# Patient Record
Sex: MALE | Race: WHITE | NOT HISPANIC OR LATINO | Employment: UNEMPLOYED | URBAN - METROPOLITAN AREA
[De-identification: names, ages, dates, MRNs, and addresses within clinical notes are randomized per-mention and may not be internally consistent; named-entity substitution may affect disease eponyms.]

---

## 2017-04-07 ENCOUNTER — APPOINTMENT (OUTPATIENT)
Dept: GENERAL RADIOLOGY | Facility: HOSPITAL | Age: 22
End: 2017-04-07

## 2017-04-07 ENCOUNTER — HOSPITAL ENCOUNTER (EMERGENCY)
Facility: HOSPITAL | Age: 22
Discharge: HOME | End: 2017-04-07
Attending: EMERGENCY MEDICINE | Admitting: EMERGENCY MEDICINE

## 2017-04-07 VITALS
HEIGHT: 71 IN | TEMPERATURE: 98.1 F | DIASTOLIC BLOOD PRESSURE: 80 MMHG | OXYGEN SATURATION: 94 % | HEART RATE: 60 BPM | RESPIRATION RATE: 16 BRPM | SYSTOLIC BLOOD PRESSURE: 133 MMHG | BODY MASS INDEX: 30.1 KG/M2 | WEIGHT: 215 LBS

## 2017-04-07 DIAGNOSIS — S86.911A KNEE STRAIN, RIGHT, INITIAL ENCOUNTER: Primary | ICD-10-CM

## 2017-04-07 PROCEDURE — 73560 X-RAY EXAM OF KNEE 1 OR 2: CPT

## 2017-04-07 PROCEDURE — 99284 EMERGENCY DEPT VISIT MOD MDM: CPT

## 2017-04-07 RX ORDER — NAPROXEN 500 MG/1
500 TABLET ORAL ONCE
Status: COMPLETED | OUTPATIENT
Start: 2017-04-07 | End: 2017-04-07

## 2017-04-07 RX ORDER — NAPROXEN 500 MG/1
500 TABLET ORAL 2 TIMES DAILY WITH MEALS
Qty: 10 TABLET | Refills: 0 | Status: SHIPPED | OUTPATIENT
Start: 2017-04-07 | End: 2017-05-25

## 2017-04-07 RX ORDER — HYDROCODONE BITARTRATE AND ACETAMINOPHEN 5; 325 MG/1; MG/1
1 TABLET ORAL EVERY 6 HOURS PRN
Qty: 10 TABLET | Refills: 0 | Status: SHIPPED | OUTPATIENT
Start: 2017-04-07 | End: 2020-01-30

## 2017-04-07 RX ADMIN — NAPROXEN 500 MG: 500 TABLET ORAL at 13:17

## 2017-04-07 NOTE — ED PROVIDER NOTES
Subjective   HPI Comments: Vision playing basketball last night states that he turned his ankle and his knee twisted describing a valgus strain.  Since that time, he has had knee pain and difficulty in straightening his knee as well as flexing his knee.  He's also been unable to bear weight.  He denies any other areas of pain including ankle, tib-fib, hip, or other portions of his extremity.  He denies any other injury or complaints.    Patient is a 21 y.o. male presenting with lower extremity pain.   History provided by:  Patient  Lower Extremity Issue   Location:  Knee  Injury: yes    Mechanism of injury comment:  TWIST  Knee location:  R knee  Pain details:     Quality:  Dull    Severity:  Moderate    Onset quality:  Sudden    Timing:  Constant    Progression:  Worsening  Chronicity:  New  Dislocation: no    Foreign body present:  No foreign bodies  Prior injury to area:  No  Relieved by:  Nothing  Worsened by:  Bearing weight  Ineffective treatments:  None tried  Associated symptoms: swelling    Associated symptoms: no back pain, no fever, no numbness and no tingling        Review of Systems   Constitutional: Negative for fever.   Musculoskeletal: Negative for back pain.   All other systems reviewed and are negative.      History reviewed. No pertinent past medical history.    No Known Allergies    History reviewed. No pertinent surgical history.    History reviewed. No pertinent family history.    Social History     Social History   • Marital status: Single     Spouse name: N/A   • Number of children: N/A   • Years of education: N/A     Social History Main Topics   • Smoking status: Never Smoker   • Smokeless tobacco: Current User     Types: Chew   • Alcohol use 3.0 oz/week     5 Cans of beer per week   • Drug use: No   • Sexual activity: Defer     Other Topics Concern   • None     Social History Narrative   • None           Objective   Physical Exam   Constitutional: He appears well-developed and  "well-nourished.   HENT:   Head: Normocephalic and atraumatic.   Pulmonary/Chest: Effort normal.   Musculoskeletal: Normal range of motion.   SMALL EFFUSION ON EXAM BUT PATELLA IS IN LINE AND VISIBLE. KNEE IS STABLE. THE AREA IS TENDER. PT DENIES ANY TENDERNESS ON PALPATION. PULSES NORMAL   Neurological: He is alert.   Skin: Skin is warm and dry.   Psychiatric: He has a normal mood and affect. His behavior is normal. Judgment and thought content normal.   Nursing note and vitals reviewed.      Procedures         ED Course  ED Course   Comment By Time   PT WITH NO OTHER COMPLAINTS OR CONCERNS ANGELA Munguia 04/07 1357   KNEE NAD PER JS VC ANGELA Munguia 04/07 1409   PT TOLD TO NOT USE IMMOBILIZER WHILE SLEEPING. ANGELA Munguia 04/07 1416                No results found for this or any previous visit (from the past 24 hour(s)).  Note: In addition to lab results from this visit, the labs listed above may include labs taken at another facility or during a different encounter within the last 24 hours. Please correlate lab times with ED admission and discharge times for further clarification of the services performed during this visit.    XR Knee 1 or 2 View Right    (Results Pending)     Vitals:    04/07/17 1137 04/07/17 1313   BP: 147/93 133/88   BP Location: Right arm    Patient Position: Sitting    Pulse: 64    Resp: 18    Temp: 98.1 °F (36.7 °C)    TempSrc: Oral    SpO2: 100% 100%   Weight: 215 lb (97.5 kg)    Height: 71\" (180.3 cm)      Medications   naproxen (NAPROSYN) tablet 500 mg (500 mg Oral Given 4/7/17 1317)     ECG/EMG Results (last 24 hours)     ** No results found for the last 24 hours. **          [facility]    Final diagnoses:   Knee strain, right, initial encounter            ANGELA Munguia  04/07/17 1420    "

## 2017-04-07 NOTE — DISCHARGE INSTRUCTIONS
USE IMMOBILIZER AND CRUTCHES UNTIL YOU SEE YOUR SPECIALIST.  Information regarding Risks and Benefits When using Opioids and Other Controlled Substances to include Storage and Disposal of Medications    When considering the use of opioids and other controlled substances for the control of pain, anxiety, or for other medical purposes, you need to know of not only the benefits of these drugs but also of potential risks in using these drugs. These drugs, as well as more drugs, have beneficial uses; which is why their use is being considered in your   care, but they have risks involved in their use, too.    Opioids:  Opioids such as hydrocodone, oxycodone, hydromorphone, and codeine are pain relieving drugs, some more potent than others. They are most useful for moderate to more severe painful conditions. Risks include sedation, loss of coordination, decreased concentration, and decreased breathing with possibility of loss of consciousness or even death, especially if used in doses higher than prescribed. Improper usage can lead to addiction, tolerance, or overdose. In addition, many of these drugs are combined with acetaminophen (Tylenol) which can damage or destroy our liver when used excessively.  Alternatives to opioids are useful for mild to moderate pain and include ibuprofen (Motrin), naproxen (Aleve), aspirin, and acetaminophen (Tylenol). As with other drugs, these medications should be used according to directions on the label or from your doctor, as overuse can cause harm.    Benzodiazepines:  This group of drugs include: alprazolam (Xanax), diazepam (Valium), lorazepam (Ativan), and clonazepam (Klonopin). These drugs are used to control anxiety symptoms including anxiety and panic attacks. Risks using these drugs include: sedation, loss of coordination, decreased ability to concentrate, effects on memory, and decreased breathing with possibility of loss of consciousness or even death. Improper and prolonged  usage can lead to addiction. An alternative without addiction potential is hydroxyzine (Vistrail).    Other Controlled Substance:  This group includes Soma, Tramadol, stimulant drugs such as Ritalin, and others. Stimulant drugs are not medications that are prescribed by ER doctors. Soma and Tramadol have sedative and addictive affects similar to opioids with the same dangers mentioned with them.    Overdose:  If you or someone else are concerned that overdose has occurred, call 911 for transportation to the nearest hospital.    Storage and Disposal:  All medications need to be kept out of the reach of children or adults who cannot manage their own medicines. In addition, controlled substances can be targeted by criminals so extra precautions need to be taken to keep them in a safe, secure place. Any unused medications should be disposed of by flushing them down the toilet in the home setting or contact your local pharmacy.

## 2017-05-25 ENCOUNTER — HOSPITAL ENCOUNTER (EMERGENCY)
Facility: HOSPITAL | Age: 22
Discharge: HOME | End: 2017-05-25
Attending: EMERGENCY MEDICINE | Admitting: EMERGENCY MEDICINE

## 2017-05-25 ENCOUNTER — APPOINTMENT (OUTPATIENT)
Dept: GENERAL RADIOLOGY | Facility: HOSPITAL | Age: 22
End: 2017-05-25

## 2017-05-25 VITALS
OXYGEN SATURATION: 99 % | SYSTOLIC BLOOD PRESSURE: 148 MMHG | HEIGHT: 71 IN | HEART RATE: 80 BPM | DIASTOLIC BLOOD PRESSURE: 70 MMHG | WEIGHT: 210 LBS | TEMPERATURE: 98.6 F | RESPIRATION RATE: 16 BRPM | BODY MASS INDEX: 29.4 KG/M2

## 2017-05-25 DIAGNOSIS — M23.91 INTERNAL DERANGEMENT OF KNEE, RIGHT: Primary | ICD-10-CM

## 2017-05-25 PROCEDURE — 73560 X-RAY EXAM OF KNEE 1 OR 2: CPT

## 2017-05-25 PROCEDURE — 99283 EMERGENCY DEPT VISIT LOW MDM: CPT

## 2017-05-25 RX ORDER — TRAMADOL HYDROCHLORIDE 50 MG/1
50 TABLET ORAL EVERY 6 HOURS PRN
Qty: 8 TABLET | Refills: 0 | Status: SHIPPED | OUTPATIENT
Start: 2017-05-25 | End: 2020-01-30

## 2017-05-25 RX ORDER — NAPROXEN 500 MG/1
500 TABLET ORAL 2 TIMES DAILY WITH MEALS
Qty: 30 TABLET | Refills: 0 | Status: SHIPPED | OUTPATIENT
Start: 2017-05-25 | End: 2020-01-30

## 2020-01-01 ENCOUNTER — APPOINTMENT (OUTPATIENT)
Dept: GENERAL RADIOLOGY | Facility: HOSPITAL | Age: 25
End: 2020-01-01

## 2020-01-01 ENCOUNTER — HOSPITAL ENCOUNTER (EMERGENCY)
Facility: HOSPITAL | Age: 25
Discharge: HOME | End: 2020-01-01
Attending: EMERGENCY MEDICINE | Admitting: EMERGENCY MEDICINE

## 2020-01-01 VITALS
HEART RATE: 113 BPM | SYSTOLIC BLOOD PRESSURE: 136 MMHG | WEIGHT: 210 LBS | RESPIRATION RATE: 20 BRPM | DIASTOLIC BLOOD PRESSURE: 74 MMHG | HEIGHT: 70 IN | OXYGEN SATURATION: 96 % | BODY MASS INDEX: 30.06 KG/M2 | TEMPERATURE: 98.6 F

## 2020-01-01 DIAGNOSIS — M23.91 INTERNAL DERANGEMENT OF RIGHT KNEE: Primary | ICD-10-CM

## 2020-01-01 PROCEDURE — 99283 EMERGENCY DEPT VISIT LOW MDM: CPT

## 2020-01-01 PROCEDURE — 73560 X-RAY EXAM OF KNEE 1 OR 2: CPT

## 2020-01-01 NOTE — ED PROVIDER NOTES
"Subjective   Pino Rodriguez is a 24 y.o.male who presents to the ED with complaints of right knee pain. The patient reports he has been experiencing pain in his right knee for the pas two days. He denies any injury or trauma to cause his pain, for he states he woke up experiencing pain yesterday morning. He states his pain causes him to have difficulty extending his knee. He adds that his knee has been, \"popping in and out of place,\" for the past couple days but his pain did not onset until yesterday. He has not taken any medication for his pain. Additionally, he states he tore the meniscus and ACL in his right knee a few years ago when he sustained a sports related injury. He did not see a surgeon following the injury. There are no other complaints at this time.       History provided by:  Patient  Knee Pain   Location:  Knee  Time since incident:  2 days  Injury: no    Knee location:  R knee  Pain details:     Quality:  Aching    Radiates to:  Does not radiate    Severity:  Moderate    Onset quality:  Sudden    Duration:  2 days    Timing:  Constant    Progression:  Unchanged  Chronicity:  New  Dislocation: no    Foreign body present:  No foreign bodies  Prior injury to area:  Yes  Relieved by:  None tried  Worsened by:  Extension  Ineffective treatments:  None tried      Review of Systems   Musculoskeletal: Positive for arthralgias (right knee).   All other systems reviewed and are negative.      History reviewed. No pertinent past medical history.    No Known Allergies    History reviewed. No pertinent surgical history.    History reviewed. No pertinent family history.    Social History     Socioeconomic History   • Marital status: Single     Spouse name: Not on file   • Number of children: Not on file   • Years of education: Not on file   • Highest education level: Not on file   Tobacco Use   • Smoking status: Never Smoker   • Smokeless tobacco: Current User     Types: Chew   Substance and Sexual Activity   • " Alcohol use: Yes     Alcohol/week: 5.0 standard drinks     Types: 5 Cans of beer per week   • Drug use: No   • Sexual activity: Defer         Objective   Physical Exam   Constitutional: He is oriented to person, place, and time. He appears well-developed and well-nourished.   HENT:   Head: Normocephalic and atraumatic.   Eyes: Conjunctivae and EOM are normal. No scleral icterus.   Neck: Normal range of motion. Neck supple.   Pulmonary/Chest: Effort normal. No respiratory distress.   Musculoskeletal: He exhibits no edema or tenderness.   Pain with extension of right knee. No focal tenderness, swelling, or deformity.  No laxity on drawer tests.  Patella not loose.  Tendons feel intact.     Neurological: He is alert and oriented to person, place, and time.   Skin: Skin is warm and dry.   Psychiatric: He has a normal mood and affect. His behavior is normal.   Nursing note and vitals reviewed.      Procedures         ED Course     XR negative, c/w prior ACL injury not treated.  No evidence of patellar or knee dislocation on x-ray.  No evidence of vascular compromise on exam.  Pt needs ortho followup.  Knee immobilizer ordered.  He has crutches.  Pt counseled.                MDM  Number of Diagnoses or Management Options  Internal derangement of right knee:      Amount and/or Complexity of Data Reviewed  Tests in the radiology section of CPT®: reviewed and ordered  Independent visualization of images, tracings, or specimens: yes        Final diagnoses:   Internal derangement of right knee       Documentation assistance provided by zara Baker.  Information recorded by the zara was done at my direction and has been verified and validated by me.     Fred Baker  01/01/20 1618       Fred Baker  01/01/20 1628       Fred Baker  01/01/20 1819       Lance Adam MD  01/01/20 2036

## 2020-01-30 ENCOUNTER — HOSPITAL ENCOUNTER (OUTPATIENT)
Dept: GENERAL RADIOLOGY | Facility: HOSPITAL | Age: 25
Discharge: HOME | End: 2020-01-30
Admitting: ORTHOPAEDIC SURGERY

## 2020-01-30 ENCOUNTER — APPOINTMENT (OUTPATIENT)
Dept: PREADMISSION TESTING | Facility: HOSPITAL | Age: 25
End: 2020-01-30

## 2020-01-30 VITALS — BODY MASS INDEX: 32.41 KG/M2 | WEIGHT: 226.41 LBS | HEIGHT: 70 IN

## 2020-01-30 LAB
ALBUMIN SERPL-MCNC: 4.8 G/DL (ref 3.5–5.2)
ALBUMIN/GLOB SERPL: 1.8 G/DL
ALP SERPL-CCNC: 79 U/L (ref 39–117)
ALT SERPL W P-5'-P-CCNC: 30 U/L (ref 1–41)
ANION GAP SERPL CALCULATED.3IONS-SCNC: 11 MMOL/L (ref 5–15)
APTT PPP: 32.8 SECONDS (ref 24–37)
AST SERPL-CCNC: 15 U/L (ref 1–40)
BACTERIA UR QL AUTO: NORMAL /HPF
BASOPHILS # BLD AUTO: 0.08 10*3/MM3 (ref 0–0.2)
BASOPHILS NFR BLD AUTO: 0.7 % (ref 0–1.5)
BILIRUB SERPL-MCNC: 0.4 MG/DL (ref 0.2–1.2)
BILIRUB UR QL STRIP: NEGATIVE
BUN BLD-MCNC: 15 MG/DL (ref 6–20)
BUN/CREAT SERPL: 15.3 (ref 7–25)
CALCIUM SPEC-SCNC: 10.3 MG/DL (ref 8.6–10.5)
CHLORIDE SERPL-SCNC: 103 MMOL/L (ref 98–107)
CLARITY UR: CLEAR
CO2 SERPL-SCNC: 26 MMOL/L (ref 22–29)
COLOR UR: YELLOW
CREAT BLD-MCNC: 0.98 MG/DL (ref 0.76–1.27)
DEPRECATED RDW RBC AUTO: 43.7 FL (ref 37–54)
EOSINOPHIL # BLD AUTO: 0.33 10*3/MM3 (ref 0–0.4)
EOSINOPHIL NFR BLD AUTO: 3.1 % (ref 0.3–6.2)
ERYTHROCYTE [DISTWIDTH] IN BLOOD BY AUTOMATED COUNT: 13.4 % (ref 12.3–15.4)
GFR SERPL CREATININE-BSD FRML MDRD: 94 ML/MIN/1.73
GLOBULIN UR ELPH-MCNC: 2.6 GM/DL
GLUCOSE BLD-MCNC: 86 MG/DL (ref 65–99)
GLUCOSE UR STRIP-MCNC: NEGATIVE MG/DL
HCT VFR BLD AUTO: 48 % (ref 37.5–51)
HGB BLD-MCNC: 15.6 G/DL (ref 13–17.7)
HGB UR QL STRIP.AUTO: NEGATIVE
HYALINE CASTS UR QL AUTO: NORMAL /LPF
IMM GRANULOCYTES # BLD AUTO: 0.04 10*3/MM3 (ref 0–0.05)
IMM GRANULOCYTES NFR BLD AUTO: 0.4 % (ref 0–0.5)
INR PPP: 0.96 (ref 0.85–1.16)
KETONES UR QL STRIP: NEGATIVE
LEUKOCYTE ESTERASE UR QL STRIP.AUTO: NEGATIVE
LYMPHOCYTES # BLD AUTO: 3.54 10*3/MM3 (ref 0.7–3.1)
LYMPHOCYTES NFR BLD AUTO: 33 % (ref 19.6–45.3)
MCH RBC QN AUTO: 28.8 PG (ref 26.6–33)
MCHC RBC AUTO-ENTMCNC: 32.5 G/DL (ref 31.5–35.7)
MCV RBC AUTO: 88.6 FL (ref 79–97)
MONOCYTES # BLD AUTO: 1.01 10*3/MM3 (ref 0.1–0.9)
MONOCYTES NFR BLD AUTO: 9.4 % (ref 5–12)
NEUTROPHILS # BLD AUTO: 5.72 10*3/MM3 (ref 1.7–7)
NEUTROPHILS NFR BLD AUTO: 53.4 % (ref 42.7–76)
NITRITE UR QL STRIP: NEGATIVE
NRBC BLD AUTO-RTO: 0 /100 WBC (ref 0–0.2)
PH UR STRIP.AUTO: <=5 [PH] (ref 5–8)
PLATELET # BLD AUTO: 486 10*3/MM3 (ref 140–450)
PMV BLD AUTO: 9.2 FL (ref 6–12)
POTASSIUM BLD-SCNC: 4.7 MMOL/L (ref 3.5–5.2)
PROT SERPL-MCNC: 7.4 G/DL (ref 6–8.5)
PROT UR QL STRIP: NEGATIVE
PROTHROMBIN TIME: 12.3 SECONDS (ref 11.2–14.3)
RBC # BLD AUTO: 5.42 10*6/MM3 (ref 4.14–5.8)
RBC # UR: NORMAL /HPF
REF LAB TEST METHOD: NORMAL
SODIUM BLD-SCNC: 140 MMOL/L (ref 136–145)
SP GR UR STRIP: 1.02 (ref 1–1.03)
SQUAMOUS #/AREA URNS HPF: NORMAL /HPF
UROBILINOGEN UR QL STRIP: NORMAL
WBC NRBC COR # BLD: 10.72 10*3/MM3 (ref 3.4–10.8)
WBC UR QL AUTO: NORMAL /HPF

## 2020-01-30 PROCEDURE — 81001 URINALYSIS AUTO W/SCOPE: CPT | Performed by: ORTHOPAEDIC SURGERY

## 2020-01-30 PROCEDURE — 71046 X-RAY EXAM CHEST 2 VIEWS: CPT

## 2020-01-30 PROCEDURE — 36415 COLL VENOUS BLD VENIPUNCTURE: CPT

## 2020-01-30 PROCEDURE — 85730 THROMBOPLASTIN TIME PARTIAL: CPT | Performed by: ORTHOPAEDIC SURGERY

## 2020-01-30 PROCEDURE — 80053 COMPREHEN METABOLIC PANEL: CPT | Performed by: ORTHOPAEDIC SURGERY

## 2020-01-30 PROCEDURE — 93005 ELECTROCARDIOGRAM TRACING: CPT

## 2020-01-30 PROCEDURE — 85610 PROTHROMBIN TIME: CPT | Performed by: ORTHOPAEDIC SURGERY

## 2020-01-30 PROCEDURE — 93010 ELECTROCARDIOGRAM REPORT: CPT | Performed by: INTERNAL MEDICINE

## 2020-01-30 PROCEDURE — 85025 COMPLETE CBC W/AUTO DIFF WBC: CPT | Performed by: ORTHOPAEDIC SURGERY

## 2020-02-03 ENCOUNTER — ANESTHESIA (OUTPATIENT)
Dept: PERIOP | Facility: HOSPITAL | Age: 25
End: 2020-02-03

## 2020-02-03 ENCOUNTER — HOSPITAL ENCOUNTER (OUTPATIENT)
Facility: HOSPITAL | Age: 25
Discharge: HOME | End: 2020-02-03
Attending: ORTHOPAEDIC SURGERY | Admitting: ORTHOPAEDIC SURGERY

## 2020-02-03 ENCOUNTER — APPOINTMENT (OUTPATIENT)
Dept: GENERAL RADIOLOGY | Facility: HOSPITAL | Age: 25
End: 2020-02-03

## 2020-02-03 ENCOUNTER — ANESTHESIA EVENT (OUTPATIENT)
Dept: PERIOP | Facility: HOSPITAL | Age: 25
End: 2020-02-03

## 2020-02-03 VITALS
DIASTOLIC BLOOD PRESSURE: 94 MMHG | WEIGHT: 226 LBS | RESPIRATION RATE: 18 BRPM | BODY MASS INDEX: 32.35 KG/M2 | OXYGEN SATURATION: 96 % | HEART RATE: 80 BPM | TEMPERATURE: 98.3 F | SYSTOLIC BLOOD PRESSURE: 141 MMHG | HEIGHT: 70 IN

## 2020-02-03 PROCEDURE — 25010000002 DEXAMETHASONE PER 1 MG: Performed by: NURSE ANESTHETIST, CERTIFIED REGISTERED

## 2020-02-03 PROCEDURE — 25010000002 BUPRENORPHINE PER 0.1 MG: Performed by: ANESTHESIOLOGY

## 2020-02-03 PROCEDURE — 25010000002 ROPIVACAINE PER 1 MG: Performed by: NURSE ANESTHETIST, CERTIFIED REGISTERED

## 2020-02-03 PROCEDURE — 25010000002 MIDAZOLAM PER 1 MG: Performed by: ANESTHESIOLOGY

## 2020-02-03 PROCEDURE — C1713 ANCHOR/SCREW BN/BN,TIS/BN: HCPCS | Performed by: ORTHOPAEDIC SURGERY

## 2020-02-03 PROCEDURE — 25010000002 DEXAMETHASONE SODIUM PHOSPHATE 10 MG/ML SOLUTION: Performed by: ANESTHESIOLOGY

## 2020-02-03 PROCEDURE — 25010000003 CEFAZOLIN IN DEXTROSE 2-4 GM/100ML-% SOLUTION: Performed by: ORTHOPAEDIC SURGERY

## 2020-02-03 PROCEDURE — 25010000002 ONDANSETRON PER 1 MG: Performed by: NURSE ANESTHETIST, CERTIFIED REGISTERED

## 2020-02-03 PROCEDURE — 25010000002 FENTANYL CITRATE (PF) 100 MCG/2ML SOLUTION: Performed by: NURSE ANESTHETIST, CERTIFIED REGISTERED

## 2020-02-03 PROCEDURE — 97162 PT EVAL MOD COMPLEX 30 MIN: CPT

## 2020-02-03 PROCEDURE — 25010000002 EPINEPHRINE PER 0.1 MG: Performed by: ORTHOPAEDIC SURGERY

## 2020-02-03 PROCEDURE — 76000 FLUOROSCOPY <1 HR PHYS/QHP: CPT

## 2020-02-03 PROCEDURE — 97116 GAIT TRAINING THERAPY: CPT

## 2020-02-03 PROCEDURE — 25010000002 HYDROMORPHONE PER 4 MG: Performed by: NURSE ANESTHETIST, CERTIFIED REGISTERED

## 2020-02-03 PROCEDURE — 25010000002 PROPOFOL 10 MG/ML EMULSION: Performed by: NURSE ANESTHETIST, CERTIFIED REGISTERED

## 2020-02-03 DEVICE — IMPLANTABLE DEVICE: Type: IMPLANTABLE DEVICE | Site: KNEE | Status: FUNCTIONAL

## 2020-02-03 DEVICE — SUT FW #2 W/TPR NDL 1/2 CIR 38IN 97CM 26.5MM BLU: Type: IMPLANTABLE DEVICE | Site: KNEE | Status: FUNCTIONAL

## 2020-02-03 DEVICE — COMP CRV FIBERSTITCH W/2 POLYSTR COMP/FW: Type: IMPLANTABLE DEVICE | Site: KNEE | Status: FUNCTIONAL

## 2020-02-03 RX ORDER — LIDOCAINE HYDROCHLORIDE 10 MG/ML
INJECTION, SOLUTION EPIDURAL; INFILTRATION; INTRACAUDAL; PERINEURAL AS NEEDED
Status: DISCONTINUED | OUTPATIENT
Start: 2020-02-03 | End: 2020-02-03 | Stop reason: SURG

## 2020-02-03 RX ORDER — DEXAMETHASONE SODIUM PHOSPHATE 4 MG/ML
INJECTION, SOLUTION INTRA-ARTICULAR; INTRALESIONAL; INTRAMUSCULAR; INTRAVENOUS; SOFT TISSUE AS NEEDED
Status: DISCONTINUED | OUTPATIENT
Start: 2020-02-03 | End: 2020-02-03 | Stop reason: SURG

## 2020-02-03 RX ORDER — LIDOCAINE HYDROCHLORIDE 10 MG/ML
0.5 INJECTION, SOLUTION EPIDURAL; INFILTRATION; INTRACAUDAL; PERINEURAL ONCE AS NEEDED
Status: COMPLETED | OUTPATIENT
Start: 2020-02-03 | End: 2020-02-03

## 2020-02-03 RX ORDER — BUPRENORPHINE HYDROCHLORIDE 0.32 MG/ML
INJECTION INTRAMUSCULAR; INTRAVENOUS
Status: COMPLETED | OUTPATIENT
Start: 2020-02-03 | End: 2020-02-03

## 2020-02-03 RX ORDER — IPRATROPIUM BROMIDE AND ALBUTEROL SULFATE 2.5; .5 MG/3ML; MG/3ML
3 SOLUTION RESPIRATORY (INHALATION) ONCE AS NEEDED
Status: DISCONTINUED | OUTPATIENT
Start: 2020-02-03 | End: 2020-02-03 | Stop reason: HOSPADM

## 2020-02-03 RX ORDER — MAGNESIUM HYDROXIDE 1200 MG/15ML
LIQUID ORAL AS NEEDED
Status: DISCONTINUED | OUTPATIENT
Start: 2020-02-03 | End: 2020-02-03 | Stop reason: HOSPADM

## 2020-02-03 RX ORDER — PREGABALIN 75 MG/1
75 CAPSULE ORAL ONCE
Status: COMPLETED | OUTPATIENT
Start: 2020-02-03 | End: 2020-02-03

## 2020-02-03 RX ORDER — PROMETHAZINE HYDROCHLORIDE 25 MG/ML
6.25 INJECTION, SOLUTION INTRAMUSCULAR; INTRAVENOUS ONCE AS NEEDED
Status: DISCONTINUED | OUTPATIENT
Start: 2020-02-03 | End: 2020-02-03 | Stop reason: HOSPADM

## 2020-02-03 RX ORDER — CEFAZOLIN SODIUM 2 G/100ML
2 INJECTION, SOLUTION INTRAVENOUS ONCE
Status: COMPLETED | OUTPATIENT
Start: 2020-02-03 | End: 2020-02-03

## 2020-02-03 RX ORDER — SODIUM CHLORIDE, SODIUM LACTATE, POTASSIUM CHLORIDE, CALCIUM CHLORIDE 600; 310; 30; 20 MG/100ML; MG/100ML; MG/100ML; MG/100ML
9 INJECTION, SOLUTION INTRAVENOUS CONTINUOUS PRN
Status: DISCONTINUED | OUTPATIENT
Start: 2020-02-03 | End: 2020-02-03 | Stop reason: HOSPADM

## 2020-02-03 RX ORDER — BUPIVACAINE HYDROCHLORIDE 2.5 MG/ML
INJECTION, SOLUTION EPIDURAL; INFILTRATION; INTRACAUDAL
Status: COMPLETED | OUTPATIENT
Start: 2020-02-03 | End: 2020-02-03

## 2020-02-03 RX ORDER — SODIUM CHLORIDE 0.9 % (FLUSH) 0.9 %
10 SYRINGE (ML) INJECTION AS NEEDED
Status: DISCONTINUED | OUTPATIENT
Start: 2020-02-03 | End: 2020-02-03 | Stop reason: HOSPADM

## 2020-02-03 RX ORDER — OXYCODONE HYDROCHLORIDE 5 MG/1
5-10 TABLET ORAL EVERY 4 HOURS PRN
Qty: 25 TABLET | Refills: 0 | OUTPATIENT
Start: 2020-02-03 | End: 2022-08-16

## 2020-02-03 RX ORDER — ONDANSETRON 2 MG/ML
INJECTION INTRAMUSCULAR; INTRAVENOUS AS NEEDED
Status: DISCONTINUED | OUTPATIENT
Start: 2020-02-03 | End: 2020-02-03 | Stop reason: SURG

## 2020-02-03 RX ORDER — BUPIVACAINE HYDROCHLORIDE 2.5 MG/ML
INJECTION, SOLUTION EPIDURAL; INFILTRATION; INTRACAUDAL
Status: DISCONTINUED | OUTPATIENT
Start: 2020-02-03 | End: 2020-02-03 | Stop reason: SURG

## 2020-02-03 RX ORDER — MEPERIDINE HYDROCHLORIDE 25 MG/ML
12.5 INJECTION INTRAMUSCULAR; INTRAVENOUS; SUBCUTANEOUS
Status: DISCONTINUED | OUTPATIENT
Start: 2020-02-03 | End: 2020-02-03 | Stop reason: HOSPADM

## 2020-02-03 RX ORDER — ONDANSETRON 4 MG/1
4 TABLET, FILM COATED ORAL EVERY 8 HOURS PRN
Qty: 10 TABLET | Refills: 0 | OUTPATIENT
Start: 2020-02-03 | End: 2022-08-16

## 2020-02-03 RX ORDER — FAMOTIDINE 20 MG/1
20 TABLET, FILM COATED ORAL
Status: COMPLETED | OUTPATIENT
Start: 2020-02-03 | End: 2020-02-03

## 2020-02-03 RX ORDER — FENTANYL CITRATE 50 UG/ML
50 INJECTION, SOLUTION INTRAMUSCULAR; INTRAVENOUS
Status: DISCONTINUED | OUTPATIENT
Start: 2020-02-03 | End: 2020-02-03 | Stop reason: HOSPADM

## 2020-02-03 RX ORDER — MIDAZOLAM HYDROCHLORIDE 1 MG/ML
INJECTION INTRAMUSCULAR; INTRAVENOUS
Status: COMPLETED | OUTPATIENT
Start: 2020-02-03 | End: 2020-02-03

## 2020-02-03 RX ORDER — SODIUM CHLORIDE 0.9 % (FLUSH) 0.9 %
10 SYRINGE (ML) INJECTION EVERY 12 HOURS SCHEDULED
Status: DISCONTINUED | OUTPATIENT
Start: 2020-02-03 | End: 2020-02-03 | Stop reason: HOSPADM

## 2020-02-03 RX ORDER — FENTANYL CITRATE 50 UG/ML
INJECTION, SOLUTION INTRAMUSCULAR; INTRAVENOUS AS NEEDED
Status: DISCONTINUED | OUTPATIENT
Start: 2020-02-03 | End: 2020-02-03 | Stop reason: SURG

## 2020-02-03 RX ORDER — PROMETHAZINE HYDROCHLORIDE 25 MG/1
25 SUPPOSITORY RECTAL ONCE AS NEEDED
Status: DISCONTINUED | OUTPATIENT
Start: 2020-02-03 | End: 2020-02-03 | Stop reason: HOSPADM

## 2020-02-03 RX ORDER — ACETAMINOPHEN 500 MG
1000 TABLET ORAL ONCE
Status: COMPLETED | OUTPATIENT
Start: 2020-02-03 | End: 2020-02-03

## 2020-02-03 RX ORDER — LABETALOL HYDROCHLORIDE 5 MG/ML
5 INJECTION, SOLUTION INTRAVENOUS
Status: DISCONTINUED | OUTPATIENT
Start: 2020-02-03 | End: 2020-02-03 | Stop reason: HOSPADM

## 2020-02-03 RX ORDER — PROMETHAZINE HYDROCHLORIDE 25 MG/1
25 TABLET ORAL ONCE AS NEEDED
Status: DISCONTINUED | OUTPATIENT
Start: 2020-02-03 | End: 2020-02-03 | Stop reason: HOSPADM

## 2020-02-03 RX ORDER — PROPOFOL 10 MG/ML
VIAL (ML) INTRAVENOUS AS NEEDED
Status: DISCONTINUED | OUTPATIENT
Start: 2020-02-03 | End: 2020-02-03 | Stop reason: SURG

## 2020-02-03 RX ORDER — HYDROMORPHONE HYDROCHLORIDE 1 MG/ML
0.5 INJECTION, SOLUTION INTRAMUSCULAR; INTRAVENOUS; SUBCUTANEOUS
Status: DISCONTINUED | OUTPATIENT
Start: 2020-02-03 | End: 2020-02-03 | Stop reason: HOSPADM

## 2020-02-03 RX ORDER — DEXAMETHASONE SODIUM PHOSPHATE 10 MG/ML
INJECTION, SOLUTION INTRAMUSCULAR; INTRAVENOUS
Status: COMPLETED | OUTPATIENT
Start: 2020-02-03 | End: 2020-02-03

## 2020-02-03 RX ADMIN — ONDANSETRON 4 MG: 2 INJECTION INTRAMUSCULAR; INTRAVENOUS at 14:32

## 2020-02-03 RX ADMIN — HYDROMORPHONE HYDROCHLORIDE 0.5 MG: 1 INJECTION, SOLUTION INTRAMUSCULAR; INTRAVENOUS; SUBCUTANEOUS at 16:33

## 2020-02-03 RX ADMIN — FAMOTIDINE 20 MG: 20 TABLET ORAL at 09:17

## 2020-02-03 RX ADMIN — PROPOFOL 200 MG: 10 INJECTION, EMULSION INTRAVENOUS at 11:49

## 2020-02-03 RX ADMIN — BUPIVACAINE HYDROCHLORIDE 30 ML: 2.5 INJECTION, SOLUTION EPIDURAL; INFILTRATION; INTRACAUDAL; PERINEURAL at 09:57

## 2020-02-03 RX ADMIN — DEXAMETHASONE SODIUM PHOSPHATE 8 MG: 4 INJECTION, SOLUTION INTRAMUSCULAR; INTRAVENOUS at 11:55

## 2020-02-03 RX ADMIN — LIDOCAINE HYDROCHLORIDE 50 MG: 10 INJECTION, SOLUTION EPIDURAL; INFILTRATION; INTRACAUDAL; PERINEURAL at 11:49

## 2020-02-03 RX ADMIN — FENTANYL CITRATE 50 MCG: 50 INJECTION, SOLUTION INTRAMUSCULAR; INTRAVENOUS at 14:14

## 2020-02-03 RX ADMIN — CEFAZOLIN SODIUM 2 G: 2 INJECTION, SOLUTION INTRAVENOUS at 11:43

## 2020-02-03 RX ADMIN — BUPRENORPHINE HYDROCHLORIDE 0.3 MG: 0.32 INJECTION INTRAMUSCULAR; INTRAVENOUS at 09:57

## 2020-02-03 RX ADMIN — FENTANYL CITRATE 50 MCG: 50 INJECTION, SOLUTION INTRAMUSCULAR; INTRAVENOUS at 11:49

## 2020-02-03 RX ADMIN — ONDANSETRON 4 MG: 2 INJECTION INTRAMUSCULAR; INTRAVENOUS at 15:03

## 2020-02-03 RX ADMIN — BUPIVACAINE HYDROCHLORIDE 20 ML: 2.5 INJECTION, SOLUTION EPIDURAL; INFILTRATION; INTRACAUDAL; PERINEURAL at 09:55

## 2020-02-03 RX ADMIN — ACETAMINOPHEN 1000 MG: 500 TABLET ORAL at 09:17

## 2020-02-03 RX ADMIN — MIDAZOLAM 2 MG: 1 INJECTION INTRAMUSCULAR; INTRAVENOUS at 09:55

## 2020-02-03 RX ADMIN — FENTANYL CITRATE 50 MCG: 50 INJECTION, SOLUTION INTRAMUSCULAR; INTRAVENOUS at 12:31

## 2020-02-03 RX ADMIN — BUPIVACAINE HYDROCHLORIDE 20 ML: 2.5 INJECTION, SOLUTION EPIDURAL; INFILTRATION; INTRACAUDAL; PERINEURAL at 15:40

## 2020-02-03 RX ADMIN — PREGABALIN 75 MG: 75 CAPSULE ORAL at 09:17

## 2020-02-03 RX ADMIN — DEXAMETHASONE SODIUM PHOSPHATE 2 MG: 10 INJECTION INTRAMUSCULAR; INTRAVENOUS at 09:57

## 2020-02-03 RX ADMIN — LIDOCAINE HYDROCHLORIDE 0.5 ML: 10 INJECTION, SOLUTION EPIDURAL; INFILTRATION; INTRACAUDAL; PERINEURAL at 09:14

## 2020-02-03 RX ADMIN — SODIUM CHLORIDE, POTASSIUM CHLORIDE, SODIUM LACTATE AND CALCIUM CHLORIDE: 600; 310; 30; 20 INJECTION, SOLUTION INTRAVENOUS at 12:56

## 2020-02-03 RX ADMIN — ROPIVACAINE HYDROCHLORIDE 10 ML/HR: 5 INJECTION, SOLUTION EPIDURAL; INFILTRATION; PERINEURAL at 15:40

## 2020-02-03 RX ADMIN — FENTANYL CITRATE 50 MCG: 0.05 INJECTION, SOLUTION INTRAMUSCULAR; INTRAVENOUS at 16:07

## 2020-02-03 RX ADMIN — SODIUM CHLORIDE, POTASSIUM CHLORIDE, SODIUM LACTATE AND CALCIUM CHLORIDE 9 ML/HR: 600; 310; 30; 20 INJECTION, SOLUTION INTRAVENOUS at 09:14

## 2020-02-03 RX ADMIN — FENTANYL CITRATE 50 MCG: 0.05 INJECTION, SOLUTION INTRAMUSCULAR; INTRAVENOUS at 16:00

## 2020-02-03 RX ADMIN — FENTANYL CITRATE 50 MCG: 50 INJECTION, SOLUTION INTRAMUSCULAR; INTRAVENOUS at 14:23

## 2020-02-03 NOTE — OP NOTE
KNEE ANTERIOR CRUCIATE LIGAMENT RECONSTRUCTION  Procedure Report    Patient Name:  Pino Rodriguez  YOB: 1995    Date of Surgery:  2/3/2020     Indications:  24 M presented with persistent instability and acute block to motion of his right knee.  History of remote injury to his knee with ACL meniscus tear at that time which was never treated.  Repeat MRI confirmed presence of chronic ACL tear with bucket-handle meniscus tear of the lateral side and loose body in the knee.  Treatment options were discussed including operative versus nonoperative treatment.  We discussed the surgery including not limited to bleeding, infection, injury to surrounding structures, persistent pain, persistent stiffness, persistent instability, persistent locking.  Re-tear of ACL reconstruction,  kneeling or re-tear of meniscus repair and the need for revision surgery.  He does understand these and does like to proceed.    Pre-op Diagnosis:     Right knee ACL tear, lateral meniscus tear, loose body    Post-op Diagnosis:     Right knee ACL tear, lateral meniscus tear, loose body, osteochondral defect of lateral femoral condyle    Procedure/CPT® Codes:  Right knee ACL reconstruction with hamstring autograft, lateral meniscus repair, loose body removal, and osteochondral autograft transplant to the lateral femoral condyle    Procedure(s):  RIGHT KNEE ANTERIOR CRUCIATE LIGAMENT RECONSTRUCTION WITH HAMSTRING AUTOGRAFT, LATERAL MENISCUS REPAIR VS PARTIAL LATERAL MENISCECTOMY LOOSE BODY REMOVAL    Staff:  Surgeon(s):  Neil Salgado MD    Assistant: Ani Ochoa PA; Linnette De Souza PA-C    Anesthesia: General with Block    Estimated Blood Loss: 50    Implants:    Implant Name Type Inv. Item Serial No.  Lot No. LRB No. Used   SUT FW #2 W/TPR NDL 1/2 CIR 38IN 97CM 26.5MM ABHI - [id number] Implant SUT FW #2 W/TPR NDL 1/2 CIR 38IN 97CM 26.5MM ABHI  [employer] [id number] Right 3   TIGHTROPE ACL REV 1SZ FITS ALL W/10MM  FLIPCUTTER2 - [id number] Implant TIGHTROPE ACL REV 1SZ FITS ALL W/10MM FLIPCUTTER2  [employer] [id number] Right 1   SUT TP TIGERLOOP 1.3MM 20IN W/76MM STR NDL B/W - [id number] Implant SUT TP TIGERLOOP 1.3MM 20IN W/76MM STR NDL B/W  [employer] [id number] Right 2   TIGHTROPE ACL REV 1SZ FITS ALL W/10MM FLIPCUTTER2 - [id number] Implant TIGHTROPE ACL REV 1SZ FITS ALL W/10MM FLIPCUTTER2  [employer] [id number] Right 1   COMP CRV FIBERSTITCH W/2 POLYSTR COMP/FW - [id number] Implant COMP CRV FIBERSTITCH W/2 POLYSTR COMP/FW  [employer] [id number] Right 2   COMP CRV FIBERSTITCH W/2 POLYSTR COMP/FW - [id number] Implant COMP CRV FIBERSTITCH W/2 POLYSTR COMP/FW  [employer] [id number] Right 3   COMP CRV FIBERSTITCH W/2 POLYSTR COMP/FW - [id number] Implant COMP CRV FIBERSTITCH W/2 POLYSTR COMP/FW  [employer] [id number] Right 1   GRAFTBOLT TIBIAL    [employer] [id number] Right 1       Specimen:                None      Findings:   Medial compartment: Meniscus intact.  No cartilage injury on the medial femoral condyle or medial tibial plateau  Lateral compartment: Displaced bucket-handle tear of the lateral meniscus which is reducible.  There is approximately.  10 x 10 mm osteochondral defect with full-thickness cartilage loss of the weightbearing surface of the lateral femoral condyle.  Grade 1 changes over the lateral tibial plateau particularly centrally and posteriorly.  Notch: ACL was completely torn.  A large loose body sitting anteriorly in the notch.  This was likely from the osteochondral defect as it did have cartilage on the loose body.  PCL is intact.  Patellofemoral joint: Cartilage intact throughout.  No loose bodies in the suprapatellar pouch, medial or lateral gutters.    Complications: None apparent    Description of Procedure:   24-year-old male was identified in the preoperative area and the right knee was marked.  He was transferred to the operating room display supine on the operative table and general anesthesia was induced.  Examination under anesthesia  showed full range of motion of the knee in flexion  with perhaps -2 degrees of extension.  He had a 2B Lockman exam and positive pivot shift.  The right leg was then prepped and draped in usual sterile fashion.  Preoperative timeout was performed indicating correct patient correct site correct procedure and that preoperative biotics have been given.  An Esmarch was then used to exsanguinate the leg and a tourniquet was inflated to 300 mmHg.  Next an incision was made over the anterior medial tibia and this was carried down through the subcutaneous tissues into the sartorius fascia was identified this was sharply incised just superior to the gracilis tendon.  The gracilis was identified whipstitched distally and then harvested using a tendon stripper.  In a similar fashion the semitendinosus was identified whipstitched and harvested using a tendon stripper.  The 2 tendons were then brought to the back table with a prepared in the usual fashion.  Scope was then placed into the knee utilizing anterior medial and anterolateral portals.  Within the notch there was the large loose body.  This was removed from the knee using a grasper.  The ACL was noted to be torn and this did appear to be more chronic.  Combination of shaver and bur were used to perform a ACL debridement followed by a notchplasty of the lateral femoral condyle to allow adequate room for Graft passage.  Sitting in the notch there is noted to be the displaced lateral bucket-handle tear.  I did reduce this using a blunt trocar.  The tear did appear to be relatively chronic and the tissue was of moderate quality.  Given his young age I did feel that an attempted repair was most appropriate for him as resection of this tissue would left him with essentially a complete meniscectomy.  Therefore I did proceed with placing fiber stitch sutures in an all inside technique along the posterior horn and extending to the mid body posteriorly of the meniscus.  In order  to do this I did have to resect some unhealthy appearing meniscal tissue.  I did note the osteochondral lesion of the lateral femoral condyle at this time as well as described above.  Next the scope was driven into the medial compartment with the findings as above.  In the patellofemoral joint there was no cartilage injury.  I next turned my attention back to the ACL reconstruction.  Using ACL guide a guidepin was drilled into the ACL footprint on the tibial side.  This was overreamed using a 10 mm reamer based on sizing of the graft on the back table.  Next through an anterior medial portal drilling technique utilizing freehand technique the femoral tunnel was drilled to a depth of approximately 25 mm a guidepin was then passed out through this and used to shuttle a free suture out the lateral cortex of the femur.  The loop 10 was then pulled through the tibial tunnel and used to shuttle up the ACL graft and attached tight rope.  The tight rope button was flipped and used to pull the graft up all the way into the femoral tunnel and secured into place.  The knee was then brought out to extension a posterior drawer was placed onto the knee and the tibial side was fixed using a graftbolt interference screw.  Once this was complete the knee was reexamined and was noted to have a 1 a Lachman exam with negative pivot shift.  I did decide to proceed with a oats procedure at this point to fill in the 1 cm full-thickness cartilage loss of the lateral femoral condyle.  The portal was extended and dissection was carried down to the subcutaneous tissues and a lateral parapatellar arthrotomy was performed.  A curette was used to create a good border of cartilage tissue surrounding the lesion.  Next in the area of decreased weightbearing over the superior lateral femoral condyle a 10 mm graft was harvested to a depth of approximately 13 mm.  In a similar fashion a plug was removed from the area of cartilage loss over the  weightbearing portion lateral femoral condyle and the plug was transferred into this and compressed until was flush with the surrounding cartilage.  The joint was then copiously irrigated.  The parotid was closed using 0 Vicryl sutures followed by 2-0 Vicryl in the subcutaneous tissues and 3-0 Monocryl for the subcuticular layer.  In a similar fashion the incision of the anterior medial tibia was closed.  The portals were closed using 3-0 Monocryl.  The leg was washed and dressed.  He was patient was placed into a hinged range of motion knee brace locked in extension to protect the repairs.  He was then awoken taken to the recovery room in stable condition.    Disposition: He will nonweightbearing to the right lower extremity.  He will start physical therapy soon as possible.  I will plan on seeing her back in 10 to 14 days for first postoperative check.      Neil Salgado MD     Date: 2/3/2020  Time: 3:29 PM

## 2020-02-03 NOTE — THERAPY DISCHARGE NOTE
Patient Name: Pion Rodriguez  : 1995    MRN: [id number]                              Today's Date: 2/3/2020       Admit Date: 2/3/2020    Visit Dx: No diagnosis found.  There is no problem list on file for this patient.    Past Medical History:   Diagnosis Date   • Migraine      Past Surgical History:   Procedure Laterality Date   • WISDOM TOOTH EXTRACTION       General Information     Row Name 20 170          PT Evaluation Time/Intention    Document Type  evaluation;discharge treatment  -LR     Mode of Treatment  physical therapy;individual therapy  -LR     Row Name 20 170          General Information    Patient Profile Reviewed?  yes  -LR     Prior Level of Function  independent:;all household mobility;community mobility;gait;transfer;bed mobility;ADL's;home management;cooking;cleaning;using stairs;shopping;driving using crutches for last month  -LR     Existing Precautions/Restrictions  fall;non-weight bearing;right;brace worn when out of bed;other (see comments) hinged knee brace to R knee locked in extension  -LR     Barriers to Rehab  previous functional deficit  -LR     Row Name 20 170          Relationship/Environment    Lives With  other (see comments) mother to assist  -LR     Row Name 20 170          Resource/Environmental Concerns    Current Living Arrangements  home/apartment/condo  -LR     Row Name 20 170          Home Main Entrance    Number of Stairs, Main Entrance  one  -LR     Stair Railings, Main Entrance  none  -LR     Row Name 20 170          Stairs Within Home, Primary    Number of Stairs, Within Home, Primary  none  -LR     Row Name 20 170          Cognitive Assessment/Intervention- PT/OT    Orientation Status (Cognition)  oriented x 4  -LR     Row Name 20 170          Safety Issues, Functional Mobility    Safety Issues Affecting Function (Mobility)  at risk behavior observed;awareness of need for assistance;insight into  deficits/self awareness;safety precautions follow-through/compliance;safety precaution awareness  -LR     Impairments Affecting Function (Mobility)  pain;strength;range of motion (ROM)  -LR       User Key  (r) = Recorded By, (t) = Taken By, (c) = Cosigned By    Initials Name Provider Type    LR Yana Salguero, PT Physical Therapist        Mobility     Row Name 02/03/20 1704          Bed Mobility Assessment/Treatment    Bed Mobility Assessment/Treatment  supine-sit;sit-supine  -LR     Supine-Sit [place] (Bed Mobility)  verbal cues;contact guard  -LR     Sit-Supine [place] (Bed Mobility)  verbal cues;contact guard  -LR     Assistive Device (Bed Mobility)  head of bed elevated;bed rails  -LR     Comment (Bed Mobility)  No assist required. Verbal cues to move LEs towards EOB and to push up from bed to raise trunk into sitting. Verbal cues to push from bed to scoot hips back and up into bed. CGA to R LE. Denied dizziness upon sitting up.   -LR     Row Name 02/03/20 1704          Transfer Assessment/Treatment    Comment (Transfers)  Verbal cues to push up from bed to stand and to reach back for bed to lower into sitting. Cues for NWB on R LE. Able to maintain with t/f.   -LR     Row Name 02/03/20 1704          Sit-Stand Transfer    Sit-Stand [place] (Transfers)  verbal cues;contact guard  -LR     Assistive Device (Sit-Stand Transfers)  crutches, axillary  -LR     Row Name 02/03/20 1709          Gait/Stairs Assessment/Training    [place] Level (Gait)  verbal cues;contact guard;1 person to manage equipment  -LR     Assistive Device (Gait)  crutches, axillary  -LR     Distance in Feet (Gait)  250  -LR     Pattern (Gait)  swing-through  -LR     [place] Level (Stairs)  verbal cues;contact guard;2 person assist  -LR     Assistive Device (Stairs)  crutches, axillary  -LR     Handrail Location (Stairs)  none  -LR     Number of Steps (Stairs)  1  -LR     Ascending Technique (Stairs)  step-to-step   -LR     Descending Technique (Stairs)  step-to-step  -LR     Comment (Gait/Stairs)  Patient ambulated with swing through gait pattern at fast pace with no LOB or unsteadiness. Verbal cues and demonstration for correct technique for stairs. Unsteady upon approaching stairs, requiring min assist. Verbal cues to hop up step on L LE and down step on L LE. Cues on how to clear toes/foot of R foot on stairs.   -LR     Row Name 02/03/20 1704          Mobility Assessment/Intervention    Extremity Weight-bearing Status  right lower extremity  -LR     Right Lower Extremity (Weight-bearing Status)  (S) non weight-bearing (NWB)  -LR       User Key  (r) = Recorded By, (t) = Taken By, (c) = Cosigned By    Initials Name Provider Type    Yana Vivas, PT Physical Therapist        Obj/Interventions     Row Name 02/03/20 1704          General ROM    GENERAL ROM COMMENTS  L LE AROM WFL; R knee locked in extension in hinged knee brace.   -LR     Row Name 02/03/20 1704          MMT (Manual Muscle Testing)    General MMT Comments  L LE WFL; R knee functionally 4-/5, independent with SLR  -LR     Row Name 02/03/20 1704          Therapeutic Exercise    Comment (Therapeutic Exercise)  defer HEP to POD#1 at outpatient PT  -LR     Row Name 02/03/20 1704          Sensory Assessment/Intervention    Sensory General Assessment  no sensation deficits identified denies numbness/tingling;light touch equal and intact  -LR       User Key  (r) = Recorded By, (t) = Taken By, (c) = Cosigned By    Initials Name Provider Type    Yana Vivas, PT Physical Therapist        Goals/Plan     Row Name 02/03/20 1704          Bed Mobility Goal 1 (PT)    Activity/Assistive Device (Bed Mobility Goal 1, PT)  sit to supine/supine to sit  -LR     [place] Level/Cues Needed (Bed Mobility Goal 1, PT)  contact guard assist  -LR     Time Frame (Bed Mobility Goal 1, PT)  long term goal (LTG);1 day  -LR     Progress/Outcomes (Bed Mobility Goal 1,  PT)  goal met  -LR     Row Name 02/03/20 1704          Transfer Goal 1 (PT)    Activity/Assistive Device (Transfer Goal 1, PT)  sit-to-stand/stand-to-sit;crutches, axillary  -LR     [place] Level/Cues Needed (Transfer Goal 1, PT)  contact guard assist  -LR     Time Frame (Transfer Goal 1, PT)  long term goal (LTG);1 day  -LR     Progress/Outcome (Transfer Goal 1, PT)  goal met  -LR     Row Name 02/03/20 1704          Gait Training Goal 1 (PT)    Activity/Assistive Device (Gait Training Goal 1, PT)  gait (walking locomotion);crutches, axillary  -LR     [place] Level (Gait Training Goal 1, PT)  contact guard assist  -LR     Distance (Gait Goal 1, PT)  150 feet  -LR     Time Frame (Gait Training Goal 1, PT)  long term goal (LTG);1 day  -LR     Progress/Outcome (Gait Training Goal 1, PT)  goal met  -LR     Row Name 02/03/20 1704          Stairs Goal 1 (PT)    Activity/Assistive Device (Stairs Goal 1, PT)  ascending stairs;descending stairs;step-to-step;crutches, axillary  -LR     [place] Level/Cues Needed (Stairs Goal 1, PT)  contact guard assist;2 person assist  -LR     Number of Stairs (Stairs Goal 1, PT)  1  -LR     Time Frame (Stairs Goal 1, PT)  long term goal (LTG);1 day  -LR     Progress/Outcome (Stairs Goal 1, PT)  goal met  -LR       User Key  (r) = Recorded By, (t) = Taken By, (c) = Cosigned By    Initials Name Provider Type    LR Yana Salguero, PT Physical Therapist        Clinical Impression     Row Name 02/03/20 1704          Pain Assessment    Additional Documentation  Pain Scale: Numbers Pre/Post-Treatment (Group)  -LR     Row Name 02/03/20 1704          Pain Scale: Numbers Pre/Post-Treatment    Pain Scale: Numbers, Pretreatment  2/10  -LR     Pain Scale: Numbers, Post-Treatment  2/10  -LR     Pain Location - Side  Right  -LR     Pain Location  knee  -LR     Pre/Post Treatment Pain Comment  soreness  -LR     Pain Intervention(s)  Ambulation/increased activity;Repositioned;Cold  applied  -LR     Row Name 02/03/20 1704          Plan of Care Review    Plan of Care Reviewed With  patient;mother  -LR     Progress  improving  -LR     Row Name 02/03/20 1704          Physical Therapy Clinical Impression    Patient/Family Goals Statement (PT Clinical Impression)  go home  -LR     Criteria for Skilled Interventions Met (PT Clinical Impression)  yes;treatment indicated  -LR     Rehab Potential (PT Clinical Summary)  good, to achieve stated therapy goals  -LR     Row Name 02/03/20 1704          Positioning and Restraints    Pre-Treatment Position  in bed  -LR     Post Treatment Position  bed  -LR     In Bed  notified nsg;supine;call light within reach;encouraged to call for assist;side rails up x2  -LR       User Key  (r) = Recorded By, (t) = Taken By, (c) = Cosigned By    Initials Name Provider Type    Yana Vivas, PT Physical Therapist        Outcome Measures     Row Name 02/03/20 1704          How much help from another person do you currently need...    Turning from your back to your side while in flat bed without using bedrails?  4  -LR     Moving from lying on back to sitting on the side of a flat bed without bedrails?  3  -LR     Moving to and from a bed to a chair (including a wheelchair)?  3  -LR     Standing up from a chair using your arms (e.g., wheelchair, bedside chair)?  3  -LR     Climbing 3-5 steps with a railing?  3  -LR     To walk in hospital room?  3  -LR     AM-PAC 6 Clicks Score (PT)  19  -LR     Row Name 02/03/20 1704          Functional Assessment    Outcome Measure Options  AM-PAC 6 Clicks Basic Mobility (PT)  -LR       User Key  (r) = Recorded By, (t) = Taken By, (c) = Cosigned By    Initials Name Provider Type    Yana Vivas, PT Physical Therapist        Physical Therapy Education                 Title: PT OT SLP Therapies (In Progress)     Topic: Physical Therapy (In Progress)     Point: Mobility training (Done)     Description:   Instruct  learner(s) on safety and technique for assisting patient out of bed, chair or wheelchair.  Instruct in the proper use of assistive devices, such as walker, crutches, cane or brace.              Patient Friendly Description:   It's important to get you on your feet again, but we need to do so in a way that is safe for you. Falling has serious consequences, and your personal safety is the most important thing of all.        When it's time to get out of bed, one of us or a family member will sit next to you on the bed to give you support.     If your doctor or nurse tells you to use a walker, crutches, a cane, or a brace, be sure you use it every time you get out of bed, even if you think you don't need it.    Learning Progress Summary           Patient Acceptance, E,LATASHA, CHECO,JAN by LR at 2/3/2020 1704    Comment:  Educated on precautions, NWB status, to keep hinged knee brace locked in extension, correct supine <->sit t/f technique, correct sit<->stand t/f technique, correct gait mechanics, correct stair training technique, and correct car t/f technique.   Mother Acceptance, E,LATASHA, CHECO,JAN by LR at 2/3/2020 1704    Comment:  Educated on precautions, NWB status, to keep hinged knee brace locked in extension, correct supine <->sit t/f technique, correct sit<->stand t/f technique, correct gait mechanics, correct stair training technique, and correct car t/f technique.                   Point: Body mechanics (Done)     Description:   Instruct learner(s) on proper positioning and spine alignment for patient and/or caregiver during mobility tasks and/or exercises.              Learning Progress Summary           Patient Acceptance, E,D, CHECO,DU by LR at 2/3/2020 1704    Comment:  Educated on precautions, NWB status, to keep hinged knee brace locked in extension, correct supine <->sit t/f technique, correct sit<->stand t/f technique, correct gait mechanics, correct stair training technique, and correct car t/f technique.   Mother  Acceptance, E,LATASHA, CHECO,JAN by LR at 2/3/2020 1704    Comment:  Educated on precautions, NWB status, to keep hinged knee brace locked in extension, correct supine <->sit t/f technique, correct sit<->stand t/f technique, correct gait mechanics, correct stair training technique, and correct car t/f technique.                   Point: Precautions (Done)     Description:   Instruct learner(s) on prescribed precautions during mobility and gait tasks              Learning Progress Summary           Patient Acceptance, E,LATASHA, CHECO,AJN by LR at 2/3/2020 1704    Comment:  Educated on precautions, NWB status, to keep hinged knee brace locked in extension, correct supine <->sit t/f technique, correct sit<->stand t/f technique, correct gait mechanics, correct stair training technique, and correct car t/f technique.   Mother Acceptance, E,LATASHA, CHECO,JAN by LR at 2/3/2020 1704    Comment:  Educated on precautions, NWB status, to keep hinged knee brace locked in extension, correct supine <->sit t/f technique, correct sit<->stand t/f technique, correct gait mechanics, correct stair training technique, and correct car t/f technique.                               User Key     Initials Effective Dates Name Provider Type Discipline     06/19/15 -  Yana Salguero, PT Physical Therapist PT              PT Recommendation and Plan  Planned Therapy Interventions (PT Eval): balance training, bed mobility training, gait training, stair training, ROM (range of motion), patient/family education, strengthening, transfer training  Outcome Summary/Treatment Plan (PT)  Anticipated Equipment Needs at Discharge (PT): (none)  Anticipated Discharge Disposition (PT): home with assist, home with OP services  Plan of Care Reviewed With: patient, mother  Progress: improving  Outcome Summary: Patient ambulated 250 feet with axillary cructhes and CGA for safety, limited by fatigue. Patient climbed 1 step with crutches and CGAx2, to simulate entrance to home.  Educated on NWB status of R LE and to keep hinged knee brace on at all times locked in extension. Patient has been d/c home with family and outpatient PT in the AM.     Time Calculation:   PT Charges     Row Name 02/03/20 1704             Time Calculation    Start Time  1704  -LR      PT Received On  02/03/20  -LR      PT Goal Re-Cert Due Date  02/13/20  -LR         Time Calculation- PT    Total Timed Code Minutes- PT  10 minute(s)  -LR         Timed Charges    11546 - Gait Training Minutes   10  -LR        User Key  (r) = Recorded By, (t) = Taken By, (c) = Cosigned By    Initials Name Provider Type    LR Yana Salguero, PT Physical Therapist        Therapy Charges for Today     Code Description Service Date Service Provider Modifiers Qty    52531452389 HC GAIT TRAINING EA 15 MIN 2/3/2020 Yana Salguero, PT GP 1    51735026594 HC PT THER SUPP EA 15 MIN 2/3/2020 Yana Salguero, PT GP 3    51098889347 HC PT EVAL MOD COMPLEXITY 3 2/3/2020 Yana Salguero, PT GP 1          PT G-Codes  Outcome Measure Options: AM-PAC 6 Clicks Basic Mobility (PT)  AM-PAC 6 Clicks Score (PT): 19    PT Discharge Summary  Anticipated Discharge Disposition (PT): home with assist, home with OP services  Reason for Discharge: Discharge from facility  Outcomes Achieved: Discharge from facility occurred on same date as evluation  Discharge Destination: Home with assist, Home with outpatient services    Yana Salguero, PT  2/3/2020

## 2020-02-03 NOTE — ANESTHESIA PROCEDURE NOTES
Peripheral Block      Patient reassessed immediately prior to procedure    Patient location during procedure: post-op  Start time: 2/3/2020 3:30 PM  Stop time: 2/3/2020 3:40 PM  Reason for block: at surgeon's request and post-op pain management  Performed by  CRNA: Phil Ontiveros, CRNA  Assisted by: Fabi Mac CRNA  Preanesthetic Checklist  Completed: patient identified, site marked, surgical consent, pre-op evaluation, timeout performed, IV checked, risks and benefits discussed and monitors and equipment checked  Prep:  Pt Position: supine  Sterile barriers:cap, gloves, mask and sterile barriers  Prep: ChloraPrep  Patient monitoring: blood pressure monitoring, continuous pulse oximetry and EKG  Procedure  Sedation:no  Performed under: spinal  Guidance:ultrasound guided  Images:still images obtained, printed/placed on chart    Laterality:right  Block Type:adductor canal block  Injection Technique:catheter  Needle Type:Tuohy and echogenic  Needle Gauge:18 G  Resistance on Injection: none  Catheter Size:20 G (20g)  Cath Depth at skin: 12 cm    Medications Used: bupivacaine PF (MARCAINE) 0.25 % injection, 20 mL  Med admintered at 2/3/2020 3:40 PM      Medications  Preservative Free Saline:5ml    Post Assessment  Injection Assessment: negative aspiration for heme, incremental injection and no paresthesia on injection  Patient Tolerance:comfortable throughout block  Complications:no  Additional Notes  Procedure:             The pt was placed in the Supine position.  The Insertion site was  prepped and Draped in sterile fashion.  The pt was anesthetized with  IV Sedation( see meds).  Skin and cutaneous tissue was infiltrated and anesthetized with 1% Lidocaine 3 mls via a 25g needle.  A BBraun 4 inch 18g echogenic needle was then  inserted approximately midline, mid-thigh and advanced In-plane with Ultrasound guidance.  Normal Saline PSF was utilized for hydrodissection of tissue.  The Vastus medialis and Sartorius  muscle where visualized and the needle tip was placed in the adductor canal,  lateral to the femoral artery.  LA injection spread was visualized, injection was incremental 1-5ml, injection pressure was normal or little, no intraneural injection, no vascular injection.  LA dose was injected thru the needle(see dose above).  A BBraun 20g wire stylet catheter was placed via the needle with ultrasound visualization and confirmation with NS fluid bolus. The labeled Catheter was then secured to skin at insertion site with skin afix and steristrips to curled catheter and CHG transparent dressing.  Thank you.

## 2020-02-03 NOTE — PLAN OF CARE
Problem: Patient Care Overview  Goal: Plan of Care Review  Outcome: Ongoing (interventions implemented as appropriate)  Flowsheets (Taken 2/3/2020 1704)  Outcome Summary: Patient ambulated 250 feet with axillary cructhes and CGA for safety, limited by fatigue. Patient climbed 1 step with crutches and CGAx2, to simulate entrance to home. Educated on NWB status of R LE and to keep hinged knee brace on at all times locked in extension. Patient has been d/c home with family and outpatient PT in the AM.

## 2020-02-03 NOTE — PLAN OF CARE
Problem: Patient Care Overview  Goal: Plan of Care Review  Outcome: Ongoing (interventions implemented as appropriate)  Flowsheets  Taken 2/3/2020 174  Progress: improving  Outcome Summary: PT  to bedside to teach crutch walking/nonbearing. Pt will go to out patient PT tomorrow. Pt and mother watched pain pump video and was given pamphlet on pump info.  Taken 2/3/2020 1645  Plan of Care Reviewed With: patient;mother     Problem: Patient Care Overview  Goal: Individualization and Mutuality  Outcome: Ongoing (interventions implemented as appropriate)     Problem: Patient Care Overview  Goal: Discharge Needs Assessment  Outcome: Ongoing (interventions implemented as appropriate)

## 2020-02-03 NOTE — ANESTHESIA PREPROCEDURE EVALUATION
Anesthesia Evaluation     Patient summary reviewed and Nursing notes reviewed   no history of anesthetic complications:  NPO Solid Status: > 8 hours  NPO Liquid Status: > 2 hours           Airway   Mallampati: II  TM distance: >3 FB  Neck ROM: full  No difficulty expected  Dental - normal exam     Pulmonary     breath sounds clear to auscultation  (+) a smoker Current Smoked day of surgery,   Cardiovascular   Exercise tolerance: excellent (>7 METS)    Rhythm: regular  Rate: normal        Neuro/Psych  (+) headaches,     GI/Hepatic/Renal/Endo      Musculoskeletal     Abdominal   (-) obese    Abdomen: soft.   Substance History      OB/GYN          Other        (-) arthritis                  Anesthesia Plan    ASA 2     general with block     intravenous induction     Anesthetic plan, all risks, benefits, and alternatives have been provided, discussed and informed consent has been obtained with: patient.    Plan discussed with CRNA.

## 2020-02-03 NOTE — ANESTHESIA PROCEDURE NOTES
Peripheral Block      Patient reassessed immediately prior to procedure    Patient location during procedure: OR  Start time: 2/3/2020 9:40 AM  Stop time: 2/3/2020 9:50 AM  Reason for block: at surgeon's request and post-op pain management  Performed by  Anesthesiologist: Chino Salcedo MD  Preanesthetic Checklist  Completed: patient identified, site marked, surgical consent, pre-op evaluation, timeout performed, IV checked, risks and benefits discussed and monitors and equipment checked  Prep:  Pt Position: left lateral decubitus  Sterile barriers:cap, gloves, mask and sterile barriers  Prep: ChloraPrep  Patient monitoring: blood pressure monitoring, continuous pulse oximetry and EKG  Procedure  Sedation:yes  Performed under: local infiltration  Guidance:ultrasound guided  Images:still images obtained, printed/placed on chart    Laterality:right  Block Type:iPack  Injection Technique:single-shot  Needle Type:echogenic and short-bevel  Needle Gauge:21 G  Resistance on Injection: none    Medications Used: bupivacaine PF (MARCAINE) 0.25 % injection, 30 mL  dexamethasone sodium phosphate injection, 2 mg  buprenorphine (BUPRENEX) injection, 0.3 mg      Medications  Comment:                       Post Assessment  Injection Assessment: negative aspiration for heme, no paresthesia on injection and incremental injection  Patient Tolerance:comfortable throughout block  Complications:no  Additional Notes  The pt was placed in  lateral position.   The pt was anesthetized with  IV Sedation( see meds).  With the use of ultrasound guidance, the popliteal artery was visualized above the shaft of the femur, the space between the popliteal artery and the femur was targeted for this procedure. The Insertion site was prepped in sterile fashion.  Skin and cutaneous tissue was infiltrated and anesthetized with 1% Lidocaine 3 mls via a 25g needle.  A BBraun 4 inch 20g echogenic needle was then  inserted approximately 2 cm proximal  to the popliteal wendi a at the lateral mid biceps femoris and advanced In-plane with Ultrasound guidance.. Normal Saline PSF was utilized for hydrodissection of tissue.   LA injection spread was visualized between the popliteal artery and femur filling this space from medial to lateral,  injection was incremental 1-5ml, injection pressure was normal or little, no intraneural injection, no vascular injection.  Throughout the injection of the LA solution the sciatic nerve components were visualized, care was taken to keep any spread of the LA solution from reaching the sciatic nerve components. Thank you

## 2020-02-03 NOTE — ANESTHESIA PROCEDURE NOTES
Peripheral Block      Patient reassessed immediately prior to procedure    Patient location during procedure: post-op  Start time: 2/3/2020 9:30 AM  Stop time: 2/3/2020 9:40 AM  Reason for block: at surgeon's request and post-op pain management  Performed by  Anesthesiologist: Chino Salcedo MD  Preanesthetic Checklist  Completed: patient identified, site marked, surgical consent, pre-op evaluation, timeout performed, IV checked, risks and benefits discussed and monitors and equipment checked  Prep:  Pt Position: supine  Sterile barriers:cap, gloves, mask and sterile barriers  Prep: ChloraPrep  Patient monitoring: blood pressure monitoring, continuous pulse oximetry and EKG  Procedure  Sedation:yes  Performed under: spinal  Guidance:ultrasound guided  Images:still images obtained, printed/placed on chart    Block Type:adductor canal block  Injection Technique:single-shot  Needle Type:Tuohy and echogenic  Needle Gauge:18 G  Resistance on Injection: none  Catheter Size:20 G (20g)    Medications Used: midazolam (VERSED) injection, 2 mg  bupivacaine PF (MARCAINE) 0.25 % injection, 20 mL      Post Assessment  Injection Assessment: negative aspiration for heme, incremental injection and no paresthesia on injection  Patient Tolerance:comfortable throughout block  Complications:no  Additional Notes  Procedure:             The pt was placed in the Supine position.  The Insertion site was  prepped and Draped in sterile fashion.  The pt was anesthetized with  IV Sedation( see meds).  Skin and cutaneous tissue was infiltrated and anesthetized with 1% Lidocaine 3 mls via a 25g needle.  A BBraun 4 inch 18g echogenic needle was then  inserted approximately midline, mid-thigh and advanced In-plane with Ultrasound guidance.  Normal Saline PSF was utilized for hydrodissection of tissue.  The Vastus medialis and Sartorius muscle where visualized and the needle tip was placed in the adductor canal,  lateral to the femoral  artery.  LA injection spread was visualized, injection was incremental 1-5ml, injection pressure was normal or little, no intraneural injection, no vascular injection.  LA dose was injected thru the needle(see dose above).  Thank you.

## 2020-02-03 NOTE — ANESTHESIA PROCEDURE NOTES
Airway  Urgency: elective    Date/Time: 2/3/2020 11:50 AM  Airway not difficult    General Information and Staff    Patient location during procedure: OR  CRNA: Brittany Ibarra CRNA    Indications and Patient Condition  Indications for airway management: airway protection    Preoxygenated: yes  Mask difficulty assessment: 1 - vent by mask    Final Airway Details  Final airway type: supraglottic airway      Successful airway: classic and I-gel  Size 4    Number of attempts at approach: 1    Additional Comments  LMA placed without difficulty, ventilation with assist, equal breath sounds and symmetric chest rise and fall

## 2020-02-03 NOTE — ANESTHESIA POSTPROCEDURE EVALUATION
Patient: Pino Rodriguez    Procedure Summary     Date:  02/03/20 Room / Location:   [facility]    Anesthesia Start:  1143 Anesthesia Stop:  1542    Procedure:  RIGHT KNEE ANTERIOR CRUCIATE LIGAMENT RECONSTRUCTION WITH HAMSTRING AUTOGRAFT, LATERAL MENISCUS REPAIR VS PARTIAL LATERAL MENISCECTOMY LOOSE BODY REMOVAL (Right Knee) Diagnosis:      Surgeon:  Neil Salgado MD Provider:  Dusty Powell MD    Anesthesia Type:  general with block ASA Status:  2          Anesthesia Type: general with block    Vitals  Vitals Value Taken Time   /65 2/3/2020  3:35 PM   Temp 98.1 °F (36.7 °C) 2/3/2020  3:30 PM   Pulse 110 2/3/2020  3:41 PM   Resp 16 2/3/2020  3:30 PM   SpO2 92 % 2/3/2020  3:41 PM   Vitals shown include unvalidated device data.        Post Anesthesia Care and Evaluation    Patient location during evaluation: PACU  Patient participation: complete - patient participated  Level of consciousness: awake and alert  Pain score: 0  Pain management: adequate  Airway patency: patent  Anesthetic complications: No anesthetic complications  PONV Status: none  Cardiovascular status: hemodynamically stable and acceptable  Respiratory status: nonlabored ventilation, acceptable and nasal cannula  Hydration status: acceptable

## 2020-02-04 NOTE — PROGRESS NOTES
LIZBETH Lema    Nerve Cath Post Op Call    Patient Name: Pino Rodriguez  :  1995  MRN:  [id number]  Date of Discharge: 2/3/2020    Nerve Cath Post Op Call:    Analgesia:Excellent  Pain Score:2/10  Side Effects:None  Catheter Site:clean  Patient Controlled ON Q pump infusion rate: 10ml/hr  Catheter Plan:Will continue with plan at home without changes and The patient was instructed to call ON CALL Anesthesia provider for any questions or problems

## 2020-02-05 NOTE — PROGRESS NOTES
LIZBETH Lema    Nerve Cath Post Op Call    Patient Name: Pino Rodriguez  :  1995  MRN:  [id number]  Date of Discharge: 2/3/2020    Nerve Cath Post Op Call:    Analgesia:Excellent  Pain Score:0/10  Side Effects:None  Catheter Site:clean  Patient Controlled ON Q pump infusion rate: 6ml/hr  Catheter Plan:Will continue with plan at home without changes and The patient was instructed to call ON CALL Anesthesia provider for any questions or problems

## 2020-02-06 NOTE — PROGRESS NOTES
LIZBETH Lema    Nerve Cath Post Op Call    Patient Name: Pino Rodriguez  :  1995  MRN:  [id number]  Date of Discharge: 2/3/2020    Nerve Cath Post Op Call:    Analgesia:Excellent  Catheter Plan:Patient/Family member report nerve catheter previously discontinued, tip intact

## (undated) DEVICE — CVR HNDL LT SURG ACCSSRY BLU STRL

## (undated) DEVICE — SUT ETHLN 3/0 PC5 18IN 1893G

## (undated) DEVICE — PAD GRND REM POLYHESIVE A/ DISP

## (undated) DEVICE — DRSNG PAD ABD 8X10IN STRL

## (undated) DEVICE — Device

## (undated) DEVICE — HOLDER: Brand: DEROYAL

## (undated) DEVICE — SUT MNCRYL PLS ANTIB UD 3/0 PS2 27IN

## (undated) DEVICE — PENCL E/S HNDSWCH ROCKRBTN HOLSTR 10FT

## (undated) DEVICE — GLV SURG TRIUMPH ORTHO W/ALOE PF LTX 8 STRL

## (undated) DEVICE — MEDI-VAC YANKAUER SUCTION HANDLE W/BULBOUS TIP: Brand: CARDINAL HEALTH

## (undated) DEVICE — BLD CUT FORMLA RESECTOR 4.0MM

## (undated) DEVICE — BLD CUT FORMLA RESECTOR 5.5MM

## (undated) DEVICE — INTENDED FOR TISSUE SEPARATION, AND OTHER PROCEDURES THAT REQUIRE A SHARP SURGICAL BLADE TO PUNCTURE OR CUT.: Brand: BARD-PARKER ® STAINLESS STEEL BLADES

## (undated) DEVICE — 2108 SERIES SAGITTAL BLADE (9.1 X 0.64 X 35.2MM)

## (undated) DEVICE — SKID PORTAL

## (undated) DEVICE — GLV SURG SENSICARE MICRO PF LF 7.5 STRL

## (undated) DEVICE — BUR BRL FORMLA 12FLUT 4MM

## (undated) DEVICE — PUMP PAIN AUTOFUSER AUTO SELCT NOBOLUS 1TO14ML/HR 550ML DISP

## (undated) DEVICE — PK ARTHSCP KN 10

## (undated) DEVICE — 3M™ MICROFOAM™ TAPE 1528-4: Brand: 3M™ MICROFOAM™

## (undated) DEVICE — INTENT TO BE USED WITH SUTURE MATERIAL FOR TISSUE CLOSURE: Brand: RICHARD-ALLAN® NEEDLE 1/2 CIRCLE TAPER

## (undated) DEVICE — 3M™ WARMING BLANKET, UPPER BODY, 10 PER CASE, 42268: Brand: BAIR HUGGER™

## (undated) DEVICE — ANTIBACTERIAL UNDYED BRAIDED (POLYGLACTIN 910), SYNTHETIC ABSORBABLE SUTURE: Brand: COATED VICRYL

## (undated) DEVICE — BNDG ESMARK 6INX9FT STRL

## (undated) DEVICE — TB CASSET ARTHSCP CROSSFLOW INFLOW LF

## (undated) DEVICE — COVER,TABLE,HVY DUTY,60"X90",STRL: Brand: MEDLINE

## (undated) DEVICE — KT ACL TRANSTIB WO/ SAWBLD

## (undated) DEVICE — SNAP KOVER: Brand: UNBRANDED

## (undated) DEVICE — TOWEL,OR,DSP,ST,BLUE,STD,8/PK,10PK/CS: Brand: MEDLINE

## (undated) DEVICE — T-DRAPE,EXTREMITY,STERILE: Brand: MEDLINE

## (undated) DEVICE — BUR BRL FORMLA 12FLUT 5.5MM

## (undated) DEVICE — DRSNG GZ PETROLTM XEROFORM CURAD 1X8IN STRL

## (undated) DEVICE — THIN OFFSET (9.0 X 0.38 X 25.0MM)

## (undated) DEVICE — PIN DRL ACL TIHTRP CLSD EYELET 4MM

## (undated) DEVICE — SUT VIC 2/0 CT2 27IN J269H

## (undated) DEVICE — PAD ARMBRD SURG CONVOL 7.5X20X2IN

## (undated) DEVICE — UNDERCAST PADDING: Brand: DEROYAL